# Patient Record
(demographics unavailable — no encounter records)

---

## 2025-03-03 NOTE — PHYSICAL EXAM
[Appropriately responsive] : appropriately responsive [Alert] : alert [No Acute Distress] : no acute distress [Regular Rate Rhythm] : regular rate rhythm [Soft] : soft [Non-tender] : non-tender [Non-distended] : non-distended [No HSM] : No HSM [No Lesions] : no lesions [No Mass] : no mass [Oriented x3] : oriented x3 [Labia Majora] : normal [Labia Minora] : normal [Normal] : normal [Uterine Adnexae] : normal [FreeTextEntry6] :  symmetric bilaterally, no palpable masses, no skin changes or dimpling, no nipple discharge, no adenopathy [FreeTextEntry5] : PAP collected, Nabothian cysts noted (pinpoint sized)

## 2025-03-03 NOTE — REASON FOR VISIT
Quality 130: Documentation Of Current Medications In The Medical Record: Current Medications Documented
Quality 431: Preventive Care And Screening: Unhealthy Alcohol Use - Screening: Patient not identified as an unhealthy alcohol user when screened for unhealthy alcohol use using a systematic screening method
Detail Level: Detailed
Quality 226: Preventive Care And Screening: Tobacco Use: Screening And Cessation Intervention: Patient screened for tobacco use and is an ex/non-smoker
[Annual] : an annual visit.
[Annual] : an annual visit.

## 2025-03-03 NOTE — DISCUSSION/SUMMARY
[FreeTextEntry1] : Well appearing female is here for gyn exam; reg menses, no complaints. , uses condoms for contraception, states she is not interested in future pregnancy, but is happy with condom use only. She denies any acute gyn complaints. She reports mother had hx of "hyperplasia" she thinks uterine around age 35, but is unsure of details, recommend patient see if she can get precise family history in the event she may qualify for hereditary genetic testing to r/o genetic predisposition to cancer.  - PAP done - Healthy diet and routine exercise recommended.  - Self breast awareness recommended - Safe sex/condom use advised  RTO in 1 year for routine gynecological exam and PRN

## 2025-03-03 NOTE — HISTORY OF PRESENT ILLNESS
[Patient reported PAP Smear was normal] : Patient reported PAP Smear was normal [Monogamous (Male Partner)] : is monogamous with a male partner [N] : Patient denies prior pregnancies [Y] : Patient uses contraception [Condoms] : uses condoms [FreeTextEntry1] : 34-year-old G0 female is here for a routine gynecological exam. She reports regular monthly periods which have gotten lighter since she has lost weight; she is , uses condoms and cycle tracking for pregnancy prevention and is happy with that method. She denies any abnormal bleeding, pelvic or breast concerns.  [PapSmeardate] : 8/2023 [TextBox_102] : Age at menarche 9yr [LMPDate] : 02/07/25 [PGHxTotal] : 0